# Patient Record
Sex: MALE
[De-identification: names, ages, dates, MRNs, and addresses within clinical notes are randomized per-mention and may not be internally consistent; named-entity substitution may affect disease eponyms.]

---

## 2023-08-14 ENCOUNTER — HOSPITAL ENCOUNTER (EMERGENCY)
Dept: HOSPITAL 97 - ER | Age: 68
Discharge: HOME | End: 2023-08-14
Payer: OTHER GOVERNMENT

## 2023-08-14 VITALS — SYSTOLIC BLOOD PRESSURE: 126 MMHG | OXYGEN SATURATION: 98 % | DIASTOLIC BLOOD PRESSURE: 72 MMHG

## 2023-08-14 VITALS — TEMPERATURE: 97.4 F

## 2023-08-14 DIAGNOSIS — R07.89: Primary | ICD-10-CM

## 2023-08-14 DIAGNOSIS — I10: ICD-10-CM

## 2023-08-14 DIAGNOSIS — Z72.0: ICD-10-CM

## 2023-08-14 DIAGNOSIS — Z88.8: ICD-10-CM

## 2023-08-14 LAB
ALBUMIN SERPL BCP-MCNC: 3.4 G/DL (ref 3.4–5)
ALP SERPL-CCNC: 85 U/L (ref 45–117)
ALT SERPL W P-5'-P-CCNC: 26 U/L (ref 16–61)
AST SERPL W P-5'-P-CCNC: 18 U/L (ref 15–37)
BILIRUB INDIRECT SERPL-MCNC: 0.4 MG/DL (ref 0.2–0.8)
BLD SMEAR INTERP: (no result)
BUN BLD-MCNC: 14 MG/DL (ref 7–18)
GLUCOSE SERPLBLD-MCNC: 109 MG/DL (ref 74–106)
HCT VFR BLD CALC: 38.5 % (ref 39.6–49)
LYMPHOCYTES # SPEC AUTO: 0.7 K/UL (ref 0.7–4.9)
MAGNESIUM SERPL-MCNC: 2 MG/DL (ref 1.6–2.4)
MCV RBC: 86.8 FL (ref 80–100)
MORPHOLOGY BLD-IMP: (no result)
NT-PROBNP SERPL-MCNC: 23 PG/ML (ref ?–125)
PMV BLD: 9.8 FL (ref 7.6–11.3)
POTASSIUM SERPL-SCNC: 3 MEQ/L (ref 3.5–5.1)
RBC # BLD: 4.43 M/UL (ref 4.33–5.43)
TROPONIN I SERPL HS-MCNC: 22.8 PG/ML (ref ?–58.9)
WBC # BLD AUTO: 3.2 THOU/UL (ref 4.3–10.9)

## 2023-08-14 PROCEDURE — 84484 ASSAY OF TROPONIN QUANT: CPT

## 2023-08-14 PROCEDURE — 71045 X-RAY EXAM CHEST 1 VIEW: CPT

## 2023-08-14 PROCEDURE — 93005 ELECTROCARDIOGRAM TRACING: CPT

## 2023-08-14 PROCEDURE — 80076 HEPATIC FUNCTION PANEL: CPT

## 2023-08-14 PROCEDURE — 36415 COLL VENOUS BLD VENIPUNCTURE: CPT

## 2023-08-14 PROCEDURE — 83880 ASSAY OF NATRIURETIC PEPTIDE: CPT

## 2023-08-14 PROCEDURE — 99285 EMERGENCY DEPT VISIT HI MDM: CPT

## 2023-08-14 PROCEDURE — 80048 BASIC METABOLIC PNL TOTAL CA: CPT

## 2023-08-14 PROCEDURE — 85025 COMPLETE CBC W/AUTO DIFF WBC: CPT

## 2023-08-14 PROCEDURE — 83735 ASSAY OF MAGNESIUM: CPT

## 2023-08-14 NOTE — EDPHYS
Physician Documentation                                                                           

 [facility]                                                                 

Name: Niles Rowell                                                                               

Age: 67 yrs                                                                                       

Sex: Male                                                                                         

: 1955                                                                                   

MRN: [id number]                                                                                   

Arrival Date: 2023                                                                          

Time: 03:29                                                                                       

Account#: [id number]                                                                            

Bed 3                                                                                             

Private MD:                                                                                       

ED Physician Abebe Barber                                                                     

HPI:                                                                                              

                                                                                             

03:39 This 67 yrs old Male presents to ER via [facility] with complaints of Chest Pain.       rt  

03:39 Patient with history of hypertension presents to the ED with chest pain that woke him   rt  

      up from sleep. He reports that it is sharp in nature, localized to the left side of the     

      chest, is nonradiating. He reported feeling dizzy at that time but no other symptoms.       

      Denies other aggravating or alleviating factors. Patient states that the pain is more       

      intense at onset, has somewhat improved but not resolved..                                  

                                                                                                  

Historical:                                                                                       

- Allergies:                                                                                      

03:41 Zocor;                                                                                  kd3 

- PMHx:                                                                                           

03:41 Hypercholesterolemia; Hypertensive disorder;                                            kd3 

                                                                                                  

- Immunization history:: Adult Immunizations up to date.                                          

- Social history:: Smoking status: Patient reports the use of cigarette tobacco                   

  products, cigars.                                                                               

                                                                                                  

                                                                                                  

ROS:                                                                                              

03:39 Constitutional: Negative for fever, chills, and weight loss, Respiratory: Negative for  rt  

      shortness of breath, cough, wheezing, and pleuritic chest pain, Abdomen/GI: Negative        

      for abdominal pain, nausea, vomiting, diarrhea, and constipation, MS/Extremity:             

      Negative for injury and deformity, Skin: Negative for injury, rash, and discoloration,      

      Psych: Negative for depression, anxiety, suicide ideation, homicidal ideation, and          

      hallucinations.                                                                             

03:39 Cardiovascular: Positive for chest pain, Negative for edema.                                

03:39 Neuro: Positive for dizziness, Negative for altered mental status.                          

                                                                                                  

Exam:                                                                                             

03:39 Constitutional:  This is a well developed, well nourished patient who is awake, alert,  rt  

      and in no acute distress. Head/Face:  Normocephalic, atraumatic. Chest/axilla:  Normal      

      chest wall appearance and motion.  Nontender with no deformity.  No lesions are             

      appreciated. Cardiovascular:  Regular rate and rhythm with a normal S1 and S2.  No          

      gallops, murmurs, or rubs.  Normal PMI, no JVD.  No pulse deficits. Respiratory:  Lungs     

      have equal breath sounds bilaterally, clear to auscultation and percussion.  No rales,      

      rhonchi or wheezes noted.  No increased work of breathing, no retractions or nasal          

      flaring. Abdomen/GI:  Soft, non-tender, with normal bowel sounds.  No distension or         

      tympany.  No guarding or rebound.  No evidence of tenderness throughout. Skin:  Warm,       

      dry with normal turgor.  Normal color with no rashes, no lesions, and no evidence of        

      cellulitis. MS/ Extremity:  Pulses equal, no cyanosis.  Neurovascular intact.  Full,        

      normal range of motion. Neuro:  Awake and alert, GCS 15, oriented to person, place,         

      time, and situation.  Cranial nerves II-XII grossly intact.  Motor strength 5/5 in all      

      extremities.  Sensory grossly intact.  Cerebellar exam normal.  Normal gait. Psych:         

      Awake, alert, with orientation to person, place and time.  Behavior, mood, and affect       

      are within normal limits.                                                                   

03:48 ECG was reviewed by the Attending Physician.                                            rt  

                                                                                                  

Vital Signs:                                                                                      

03:36  / 77; Pulse 73; Resp 16; Temp 97.4(O); Pulse Ox 98% on R/A; Weight 84.82 kg;     kd3 

      Height 5 ft. 10 in. ;                                                                       

05:17  / 68; Pulse 73; Resp 19; Pulse Ox 100% on R/A;                                   kd3 

05:29  / 70; Pulse 74; Resp 18; Pulse Ox 99% on R/A;                                    kd3 

07:03  / 72; Pulse 72; Resp 16; Pulse Ox 98% on R/A;                                    jb4 

03:36 Body Mass Index 26.83 (84.82 kg, 177.8 cm)                                              kd3 

                                                                                                  

MDM:                                                                                              

03:33 Patient medically screened.                                                             rt  

06:31 Differential diagnosis: acute myocardial infarction, pneumonia, pneumothorax. HEART     rt  

      Score: History: Slightly Suspicious (0), ECG: Normal (0), Age: > or = 65 years (2),         

      Risk Factors: 1 or 2 risk factors (1), Troponin: < or = 1 x Normal Limit (0), Total         

      Score = 3. The patient was given aspirin in the Emergency Department. Data reviewed:        

      vital signs, nurses notes, lab test result(s), EKG, radiologic studies. Consideration       

      of Admission/Observation Escalation of care including admission/observation considered.     

      Offered patient mission to the hospital, strongly desirous of discharge, repeat             

      troponin shows no changes. Patient is chest pain-free, comfortable with discharge.          

      Strict return precautions were given. Patient to follow-up in the outpatient setting        

      with cardiology in his hometown.. Independent interpretation of the following test(s)       

      in the Emergency Department X-Ray: My interpretation is No consolidation seen on            

      interpretation of the x-ray images. Test considered but Not performed: CT: Low              

      suspicion for PE, CT angiogram not indicated. Care significantly affected by the            

      following chronic conditions: Hypertension. Counseling: I had a detailed discussion         

      with the patient and/or guardian regarding: the historical points, exam findings, and       

      any diagnostic results supporting the discharge/admit diagnosis, lab results, radiology     

      results, the need for outpatient follow up, to return to the emergency department if        

      symptoms worsen or persist or if there are any questions or concerns that arise at          

      home. Response to treatment: the patient's symptoms have resolved after treatment.          

                                                                                                  

                                                                                             

03:34 Order name: Basic Metabolic Panel; Complete Time: 04:59                                 rt  

                                                                                             

03:34 Order name: CBC with Diff; Complete Time: 05:17                                         rt  

                                                                                             

03:34 Order name: LFT's; Complete Time: 04:59                                                 rt  

                                                                                             

03:34 Order name: Magnesium; Complete Time: 04:59                                             rt  

                                                                                             

03:34 Order name: NT PRO-BNP; Complete Time: 04:59                                            rt  

                                                                                             

03:34 Order name: Troponin HS; Complete Time: 04:59                                           rt  

                                                                                             

04:35 Order name: CBC Smear Scan; Complete Time: 05:17                                        EDMS

                                                                                             

05:17 Order name: Troponin High Sensitivity; Complete Time: 06:11                             rt  

                                                                                             

03:34 Order name: XRAY Chest (1 view)                                                         rt  

                                                                                             

03:34 Order name: EKG; Complete Time: 03:34                                                   rt  

                                                                                             

03:34 Order name: Cardiac monitoring; Complete Time: 03:52                                    rt  

                                                                                             

03:34 Order name: EKG - Nurse/Tech; Complete Time: 03:52                                      rt  

                                                                                             

03:34 Order name: IV Saline Lock; Complete Time: 04:12                                        rt  

                                                                                             

03:34 Order name: Labs collected and sent; Complete Time: 04:12                               rt  

                                                                                             

03:34 Order name: O2 Per Protocol; Complete Time: 03:52                                       rt  

                                                                                             

03:34 Order name: O2 Sat Monitoring; Complete Time: 03:52                                     rt  

                                                                                                  

EC:48 Rate is 69 beats/min. Rhythm is regular, Normal Sinus Rhythm with No ectopy. QRS Axis   rt  

      is Normal. ND interval is normal. QRS interval is normal. QT interval is normal. No Q       

      waves. T waves are Normal. No ST changes noted. Interpreted by me.                          

                                                                                                  

Administered Medications:                                                                         

03:56 Drug: Aspirin PO Chewable Tablet 324 mg Route: PO;                                      kd3 

03:56 Drug: Nitroglycerin Sublingual 0.4 mg Route: Sublingual;                                kd3 

04:10 Not Given (Patient Refused): fentaNYL (PF) IVP 50 mcg IVP once                          kd3 

05:32 Drug: Potassium Chloride PO 40 mEq Route: PO;                                           jb4 

                                                                                                  

                                                                                                  

Disposition Summary:                                                                              

23 06:31                                                                                    

Discharge Ordered                                                                                 

      Location: Home                                                                          rt  

      Problem: new                                                                            rt  

      Symptoms: are resolved                                                                  rt  

      Condition: Stable                                                                       rt  

      Diagnosis                                                                                   

        - Chest pain, unspecified                                                             rt  

      Followup:                                                                               rt  

        - With: Private Physician                                                                  

        - When: 2 - 3 days                                                                         

        - Reason:                                                                                  

      Discharge Instructions:                                                                     

        - Discharge Summary Sheet                                                             rt  

        - Nonspecific Chest Pain, Adult                                                       rt  

      Forms:                                                                                      

        - Medication Reconciliation Form                                                      rt  

        - Thank You Letter                                                                    rt  

        - Antibiotic Education                                                                rt  

        - Prescription Opioid Use                                                             rt  

        - Patient Portal Instructions                                                         rt  

        - Leadership Thank You Letter                                                         rt  

Signatures:                                                                                       

Dispatcher [employer]                           Satya Ramsay RN                       RN   jb4                                                  

Gaye Zuñiga RN                      RN   kd3                                                  

Abebe Barber MD MD   rt                                                   

                                                                                                  

**************************************************************************************************

## 2023-08-14 NOTE — ER
Nurse's Notes                                                                                     

 [facility]                                                                 

Name: Niles Rowell                                                                               

Age: 67 yrs                                                                                       

Sex: Male                                                                                         

: 1955                                                                                   

MRN: [id number]                                                                                   

Arrival Date: 2023                                                                          

Time: 03:29                                                                                       

Account#: [id number]                                                                            

Bed 3                                                                                             

Private MD:                                                                                       

Diagnosis: Chest pain, unspecified                                                                

                                                                                                  

Presentation:                                                                                     

                                                                                             

03:36 Chief complaint: EMS states: PT reported having 8/10 chest pain on arrival that began   kd3 

      30 minutes prior to EMS being on scene. Coronavirus screen: Vaccine status: Patient         

      reports receiving the 2nd dose of the covid vaccine. Ebola Screen: No symptoms or risks     

      identified at this time. Initial Sepsis Screen: Does the patient meet any 2 criteria?       

      No. Patient's initial sepsis screen is negative. Does the patient have a suspected          

      source of infection? No. Patient's initial sepsis screen is negative. Risk Assessment:      

      Do you want to hurt yourself or someone else? Patient reports no desire to harm self or     

      others. Onset of symptoms was 2023.                                              

03:36 Method Of Arrival: EMS: [facility]                                                    kd3 

03:36 Acuity: RAD 3                                                                           kd3 

                                                                                                  

Triage Assessment:                                                                                

03:41 General: Appears uncomfortable, Behavior is calm, cooperative. Pain: Complains of pain  kd3 

      in chest Pain currently is 6 out of 10 on a pain scale. Cardiovascular: Patient's skin      

      is warm and dry.                                                                            

                                                                                                  

Historical:                                                                                       

- Allergies:                                                                                      

03:41 Zocor;                                                                                  kd3 

- PMHx:                                                                                           

03:41 Hypercholesterolemia; Hypertensive disorder;                                            kd3 

                                                                                                  

- Immunization history:: Adult Immunizations up to date.                                          

- Social history:: Smoking status: Patient reports the use of cigarette tobacco                   

  products, cigars.                                                                               

                                                                                                  

                                                                                                  

Screenin:56 [facility] ED Fall Risk Assessment (Adult) History of falling in the last 3 months,       kd3 

      including since admission No falls in past 3 months (0 pts) Confusion or Disorientation     

      No (0 pts) Intoxicated or Sedated No (0 pts) Impaired Gait No (0 pts) Mobility Assist       

      Device Used No (0 pt) Altered Elimination No (0 pt) Score/Fall Risk Level 0 - 2 = Low       

      Risk Maintained a safe environment. Abuse screen: Denies threats or abuse. Denies           

      injuries from another. Nutritional screening: No deficits noted. Tuberculosis               

      screening: No symptoms or risk factors identified.                                          

                                                                                                  

Assessment:                                                                                       

03:56 General: Appears in no apparent distress. Behavior is calm, cooperative. Pain:          kd3 

      Complains of pain in chest Pain does not radiate. Pain currently is 6 out of 10 on a        

      pain scale. Pain began gradually.                                                           

03:56 Neuro: Level of Consciousness is awake, alert, obeys commands, Oriented to person,      kd3 

      place, time, situation. Cardiovascular: Patient's skin is warm and dry. Respiratory:        

      Airway is patent Trachea midline Respiratory effort is even, unlabored, Respiratory         

      pattern is regular, symmetrical.                                                            

04:10 Pain: Pain currently is 5 out of 10 on a pain scale.                                    kd3 

04:10 General: Pt rates his chest pain a 5 out of 10 now after 1 dose of nitro. Pt states he  kd3 

      doesn't want any more nitro because he doesn't like the way it makes him feel. Pt           

      offered other pain medication options but refuses at this time. .                           

05:38 Reassessment: Patient appears in no apparent distress at this time. Patient and/or      jb4 

      family updated on plan of care and expected duration. Pain level reassessed. Patient is     

      alert, oriented x 3, equal unlabored respirations, skin warm/dry/pink.                      

07:03 Reassessment: Patient appears in no apparent distress at this time. Patient and/or      jb4 

      family updated on plan of care and expected duration. Pain level reassessed. Patient is     

      alert, oriented x 3, equal unlabored respirations, skin warm/dry/pink.                      

                                                                                                  

Vital Signs:                                                                                      

03:36  / 77; Pulse 73; Resp 16; Temp 97.4(O); Pulse Ox 98% on R/A; Weight 84.82 kg;     kd3 

      Height 5 ft. 10 in. ;                                                                       

05:17  / 68; Pulse 73; Resp 19; Pulse Ox 100% on R/A;                                   kd3 

05:29  / 70; Pulse 74; Resp 18; Pulse Ox 99% on R/A;                                    kd3 

07:03  / 72; Pulse 72; Resp 16; Pulse Ox 98% on R/A;                                    jb4 

03:36 Body Mass Index 26.83 (84.82 kg, 177.8 cm)                                              kd3 

                                                                                                  

ED Course:                                                                                        

03:31 Patient arrived in ED.                                                                  sb4 

03:33 Abebe Barber MD is Attending Physician.                                            rt  

03:36 Gaye Zuñiga RN is Primary Nurse.                                                    kd3 

03:41 Triage completed.                                                                       kd3 

03:41 Arm band placed on right wrist.                                                         kd3 

03:47 XRAY Chest (1 view) In Process Unspecified.                                             EDMS

03:51 Missed attempt(s): 20 gauge in right antecubital area.                                  kd3 

03:51 Missed attempt(s): 20 gauge in right forearm.                                           kd3 

03:57 Patient has correct armband on for positive identification. Provided Education on: .    kd3 

      Client placed on continuous cardiac and pulse oximetry monitoring. NIBP monitoring          

      applied. Cardiac monitor on.                                                                

03:57 No provider procedures requiring assistance completed. Patient maintains SpO2           kd3 

      saturation greater than 95% on room air.                                                    

04:12 Troponin HS Sent.                                                                       kd3 

04:12 NT PRO-BNP Sent.                                                                        kd3 

04:12 Magnesium Sent.                                                                         kd3 

04:12 LFT's Sent.                                                                             kd3 

04:12 CBC with Diff Sent.                                                                     kd3 

04:12 Basic Metabolic Panel Sent.                                                             kd3 

07:03 IV discontinued, intact, bleeding controlled, No redness/swelling at site. Pressure     jb4 

      dressing applied.                                                                           

                                                                                                  

Administered Medications:                                                                         

03:56 Drug: Aspirin PO Chewable Tablet 324 mg Route: PO;                                      kd3 

03:56 Drug: Nitroglycerin Sublingual 0.4 mg Route: Sublingual;                                kd3 

04:10 Not Given (Patient Refused): fentaNYL (PF) IVP 50 mcg IVP once                          kd3 

05:32 Drug: Potassium Chloride PO 40 mEq Route: PO;                                           jb4 

                                                                                                  

                                                                                                  

Medication:                                                                                       

03:57 VIS not applicable for this client.                                                     kd3 

                                                                                                  

Outcome:                                                                                          

06:31 Discharge ordered by MD.                                                                rt  

07:03 Discharged to home ambulatory.                                                          jb4 

07:03 Condition: stable                                                                           

07:03 Discharge instructions given to patient, Instructed on discharge instructions, follow       

      up and referral plans. Demonstrated understanding of instructions, follow-up care.          

07:04 Patient left the ED.                                                                    jb4 

                                                                                                  

Signatures:                                                                                       

Dispatcher [employer]                           EDSatya Villalpando RN                       RN   jb4                                                  

Gaye Zuñiga RN                      RN   kd3                                                  

Adrianne Clinton, PAMAREN                     PAStanC [email or web address]                                                  

Abebe Barber MD MD   rt                                                   

                                                                                                  

**************************************************************************************************

## 2023-08-14 NOTE — RAD REPORT
EXAM DESCRIPTION:  RAD - Chest Single View - 8/14/2023 3:45 am

 

 

 

CLINICAL HISTORY:  The patient is 67 years old and is Male; CHEST PAIN

 

TECHNIQUE:  Frontal view of the chest.

 

COMPARISON:  No relevant prior studies available.

 

FINDINGS:  Lungs:   Unremarkable.   No consolidation.

  Pleural space:   Unremarkable.   No pneumothorax.

  Heart:   Unremarkable.

  Mediastinum:   Unremarkable.

  Bones/joints:   Unremarkable.

 

IMPRESSION:  No acute findings in the chest.

 

Electronically signed by:   Boris Mccallum MD   8/14/2023 4:06 AM CDT Workstation: [id number]

 

 

Due to temporary technical issues with the PACS/Fluency reporting system, reports are being signed by
 the in house radiologist without review as a courtesy to ensure prompt reporting. The interpreting r
adiologist is fully responsible for the content of the report.

## 2023-08-14 NOTE — EKG
Test Date:    2023-08-14               Test Time:    03:38:05

Technician:   DA                                     

                                                     

MEASUREMENT RESULTS:                                       

Intervals:                                           

Rate:         69                                     

IN:           186                                    

QRSD:         106                                    

QT:           436                                    

QTc:          467                                    

Axis:                                                

P:            73                                     

IN:           186                                    

QRS:          55                                     

T:            45                                     

                                                     

INTERPRETIVE STATEMENTS:                                       

                                                     

Normal sinus rhythm

Normal ECG

No previous ECG available for comparison



Electronically Signed On 08-14-23 13:09:22 CDT by Srinath Ocampo